# Patient Record
Sex: MALE | Race: BLACK OR AFRICAN AMERICAN | Employment: UNEMPLOYED | URBAN - METROPOLITAN AREA
[De-identification: names, ages, dates, MRNs, and addresses within clinical notes are randomized per-mention and may not be internally consistent; named-entity substitution may affect disease eponyms.]

---

## 2021-05-26 ENCOUNTER — OFFICE VISIT (OUTPATIENT)
Dept: FAMILY MEDICINE CLINIC | Age: 21
End: 2021-05-26

## 2021-05-26 ENCOUNTER — HOSPITAL ENCOUNTER (OUTPATIENT)
Dept: LAB | Age: 21
Discharge: HOME | End: 2021-05-26

## 2021-05-26 VITALS
BODY MASS INDEX: 31.07 KG/M2 | TEMPERATURE: 98 F | HEIGHT: 68 IN | WEIGHT: 205 LBS | HEART RATE: 61 BPM | DIASTOLIC BLOOD PRESSURE: 66 MMHG | SYSTOLIC BLOOD PRESSURE: 109 MMHG | OXYGEN SATURATION: 100 %

## 2021-05-26 DIAGNOSIS — Z02.0 SCHOOL PHYSICAL EXAM: ICD-10-CM

## 2021-05-26 DIAGNOSIS — Z23 ENCOUNTER FOR IMMUNIZATION: ICD-10-CM

## 2021-05-26 DIAGNOSIS — Z11.1 SCREENING-PULMONARY TB: ICD-10-CM

## 2021-05-26 DIAGNOSIS — Z02.0 SCHOOL PHYSICAL EXAM: Primary | ICD-10-CM

## 2021-05-26 LAB — HGB BLD-MCNC: 14.8 G/DL

## 2021-05-26 PROCEDURE — 85018 HEMOGLOBIN: CPT | Performed by: FAMILY MEDICINE

## 2021-05-26 PROCEDURE — 99385 PREV VISIT NEW AGE 18-39: CPT | Performed by: FAMILY MEDICINE

## 2021-05-26 PROCEDURE — 86480 TB TEST CELL IMMUN MEASURE: CPT

## 2021-05-26 PROCEDURE — 86765 RUBEOLA ANTIBODY: CPT

## 2021-05-26 PROCEDURE — 86706 HEP B SURFACE ANTIBODY: CPT

## 2021-05-26 PROCEDURE — 0001A COVID-19, MRNA, LNP-S, PF, 30MCG/0.3ML DOSE(PFIZER): CPT

## 2021-05-26 PROCEDURE — 91300 COVID-19, MRNA, LNP-S, PF, 30MCG/0.3ML DOSE(PFIZER): CPT

## 2021-05-26 PROCEDURE — 86787 VARICELLA-ZOSTER ANTIBODY: CPT

## 2021-05-26 NOTE — PROGRESS NOTES
Results for orders placed or performed in visit on 05/26/21   AMB POC HEMOGLOBIN (HGB)   Result Value Ref Range    Hemoglobin (POC) 14.8 G/DL

## 2021-05-26 NOTE — PROGRESS NOTES
HISTORY OF PRESENT ILLNESS  Noni Lopez is a 23 y.o. male. HPI  23year old, needs to get to a GED program.  He needs updated immunizations. No medical problems. No allergies  Takes no medications. Review of Systems   Constitutional: Negative for chills, fever and weight loss. Respiratory: Negative for cough, shortness of breath and wheezing. Cardiovascular: Negative for chest pain and palpitations. Gastrointestinal: Negative for abdominal pain, constipation, diarrhea, heartburn, nausea and vomiting. Musculoskeletal: Negative for back pain, myalgias and neck pain. Skin: Negative for itching and rash. Neurological: Negative for dizziness, tingling and headaches. /66 (BP 1 Location: Left arm, BP Patient Position: Sitting)   Pulse 61   Temp 98 °F (36.7 °C)   Ht 5' 7.52\" (1.715 m)   Wt 205 lb (93 kg)   SpO2 100%   BMI 31.61 kg/m²   Physical Exam  Constitutional:       General: He is not in acute distress. Appearance: He is not ill-appearing. HENT:      Head: Normocephalic. Right Ear: Tympanic membrane normal.      Left Ear: Tympanic membrane normal.      Nose: Nose normal. No congestion. Mouth/Throat:      Mouth: Mucous membranes are moist.      Pharynx: No oropharyngeal exudate. Eyes:      General:         Right eye: No discharge. Left eye: No discharge. Extraocular Movements: Extraocular movements intact. Pupils: Pupils are equal, round, and reactive to light. Cardiovascular:      Rate and Rhythm: Normal rate and regular rhythm. Pulses: Normal pulses. Heart sounds: Normal heart sounds. No murmur heard. Pulmonary:      Effort: Pulmonary effort is normal. No respiratory distress. Breath sounds: Normal breath sounds. No wheezing or rhonchi. Abdominal:      General: Bowel sounds are normal. There is no distension. Palpations: Abdomen is soft. Tenderness: There is no abdominal tenderness.       Hernia: No hernia is present. Musculoskeletal:         General: No swelling, tenderness or deformity. Normal range of motion. Cervical back: Normal range of motion. No rigidity. Skin:     General: Skin is warm. Findings: No bruising or erythema. Neurological:      Mental Status: He is alert. ASSESSMENT and PLAN  Diagnoses and all orders for this visit:    1. School physical exam  -     AMB POC HEMOGLOBIN (HGB)  -     MEASLES/MUMPS/RUBELLA IMMUNITY; Future  -     VZV AB, IGG; Future  -     HEP B SURFACE AB; Future    2. Encounter for immunization  -     COVID-19, MRNA, LNP-S, PF, 30MCG/0.3ML DOSE(PFIZER)    3. Screening-pulmonary TB  -     QUANTIFERON-TB PLUS(CLIENT INCUB.);  Future      Well 23year old male,  Here for school physical,  He does not have the immunization records with him today but is requesting them  We will check serology today  Tb test ordered  COVID-19 vaccine

## 2021-05-26 NOTE — PROGRESS NOTES
I have copied the provider's check out note here and have reviewed these items with the patient today:  OK for COVID-19 vaccine     Patient will need labs to determine immunity   The vaccine records are being requested   If he has not received a TDaP in 10 years, he will need one too (can wait for records)  An After Visit Summary was printed and reviewed with he patient. COVID vaccine administered    [employer]        .

## 2021-05-27 LAB
HBV SURFACE AB SER QL: REACTIVE
HBV SURFACE AB SER-ACNC: 260.43 MIU/ML

## 2021-05-28 LAB
MEV IGG SER IA-ACNC: 23.8 AU/ML
MUV IGG SER IA-ACNC: 24.4 AU/ML
RUBV IGG SERPL IA-ACNC: 9.99 INDEX
VZV IGG SER IA-ACNC: 1088 INDEX

## 2021-05-30 LAB
M TB IFN-G BLD-IMP: POSITIVE
QUANTIFERON CRITERIA, QFI1T: ABNORMAL
QUANTIFERON MITOGEN VALUE: >10 IU/ML
QUANTIFERON NIL VALUE: 0.84 IU/ML
QUANTIFERON TB1 AG: >10 IU/ML
QUANTIFERON TB2 AG: >10 IU/ML

## 2021-06-02 ENCOUNTER — TELEPHONE (OUTPATIENT)
Dept: FAMILY MEDICINE CLINIC | Age: 21
End: 2021-06-02

## 2021-06-02 DIAGNOSIS — R76.12 POSITIVE QUANTIFERON-TB GOLD TEST: Primary | ICD-10-CM

## 2021-06-02 NOTE — PROGRESS NOTES
Patient is immune to hep B, varicella, measles, mumps and rubella    Tuberculosis test is positive,  he has no symptoms, we will need to order a chest x ray (will be placed in orders) and we need to [facility].   He will likely need latent TB treatment given by the health department  Patient can be informed

## 2021-06-04 ENCOUNTER — TELEPHONE (OUTPATIENT)
Dept: FAMILY MEDICINE CLINIC | Age: 21
End: 2021-06-04

## 2021-06-04 NOTE — TELEPHONE ENCOUNTER
----- Message from Haile Boyer MD sent at 6/2/2021  8:43 AM EDT -----  Patient is immune to hep B, varicella, measles, mumps and rubella    Tuberculosis test is positive,  he has no symptoms, we will need to order a chest x ray (will be placed in orders) and we need to contact the TB clinic at the health department.   He will likely need latent TB treatment given by the health department  Patient can be informed

## 2021-06-04 NOTE — PROGRESS NOTES
Tc to the pt. The pt's father (who is on the [address] [place] [address] Road) verified his name, and pt's name and . The father was told the pt's Quantiferon Gold for TB was positive. Explained procedure for obtaining xray as a walk-in and told parent for pt to go to Outpatient Registration. Provided address and directions to facility. Told parent that someone will call them after we get results. The Care Card process was explained to the parent. The parent was told the HD would be contacted and they would contact them if the pt needed tx for TB. The TB tx is free if needed. The parent verbalized understanding.  Keven Carrasquillo RN

## 2021-06-07 NOTE — TELEPHONE ENCOUNTER
Tc to Ailyn Ritter RN at the [facility]. To 2 different numbers for Mr Dev Zepeda. No answer. A message was left on both number for Mr Dev Zepeda. The Nurse called back. He was given the lab quantiferon Gold Plus for TB positive results. He was given the parents name and the pt's name and , and address. Mr Dev Zepeda was told this nurse would fax over the TB results and demo sheet as soon as a fax machine was available. The nurse will go to the [address] tomorrow to work so will fax the needed information to the  tomorrow.  Lord Amador RN

## 2021-06-08 NOTE — TELEPHONE ENCOUNTER
Tc to the pt's father. He was identified by name and . He was reminded that the [facility] was notified of the Positive TB test and they have asked this nurse to follow up on the pt's chest xray. The parent was asked if he still had plans to get this xray. The HD is recommending it to make sure weather the pt needs treatment for Tb or not. The father stated yes he plans on taking the pt tomorrow to get the xray. The parent was explained again about the Care card. The parent stated he still had the name and address and the telephone # of the facility, and it is just 10 min from his house.  Amie Calvo RN

## 2021-06-08 NOTE — TELEPHONE ENCOUNTER
The pt's demographic sheet and positive Quantiferon Gold lab result, was faxed to the [facility]. Attn: Nely Kitchen RN.  Carla Monroy RN

## 2021-06-09 ENCOUNTER — HOSPITAL ENCOUNTER (OUTPATIENT)
Dept: GENERAL RADIOLOGY | Age: 21
Discharge: HOME | End: 2021-06-09
Attending: FAMILY MEDICINE

## 2021-06-09 DIAGNOSIS — R76.12 POSITIVE QUANTIFERON-TB GOLD TEST: ICD-10-CM

## 2021-06-09 PROCEDURE — 71046 X-RAY EXAM CHEST 2 VIEWS: CPT

## 2021-06-09 NOTE — PROGRESS NOTES
Normal chest x ray,  please contact the health department, Surya Lerma at the TB clinic) patient will need to start treatment for latent tb with them

## 2021-06-10 NOTE — TELEPHONE ENCOUNTER
The pt had the x-ray the provider had recommended and ordered. The chest xray was faxed to the [facility].  Keven Carrasquillo RN

## 2021-06-10 NOTE — PROGRESS NOTES
The [facility] has been contacted, and I have reported the pt's positive Quantiferon Gold result to Suad Wilson RN. I have faxed the Demo sheet and the TB test result to him as he requested. Today I have faxed the chest xray to Mr Brianna Crum, at the [facility].  David Thompson RN

## 2021-06-16 ENCOUNTER — IMMUNIZATION (OUTPATIENT)
Dept: FAMILY MEDICINE CLINIC | Age: 21
End: 2021-06-16

## 2021-06-16 DIAGNOSIS — Z23 ENCOUNTER FOR IMMUNIZATION: Primary | ICD-10-CM

## 2021-06-16 PROCEDURE — 0001A COVID-19, MRNA, LNP-S, PF, 30MCG/0.3ML DOSE(PFIZER): CPT

## 2021-06-16 PROCEDURE — 91300 COVID-19, MRNA, LNP-S, PF, 30MCG/0.3ML DOSE(PFIZER): CPT
